# Patient Record
(demographics unavailable — no encounter records)

---

## 2025-03-06 NOTE — DISCUSSION/SUMMARY
[FreeTextEntry1] : Echo Ex stress test 5-day monitor Check labs Same meds Discussed health risks of alcohol in particular cardiac.  Referred to [facility] outpatient. follow up PCP.  [EKG obtained to assist in diagnosis and management of assessed problem(s)] : EKG obtained to assist in diagnosis and management of assessed problem(s)

## 2025-03-06 NOTE — DISCUSSION/SUMMARY
[FreeTextEntry1] : Echo Ex stress test 5-day monitor Check labs Same meds Discussed health risks of alcohol in particular cardiac.  Referred to [facility] outpatient. follow up PCP.  [EKG obtained to assist in diagnosis and management of assessed problem(s)] : EKG obtained to assist in diagnosis and management of assessed problem(s)

## 2025-03-06 NOTE — HISTORY OF PRESENT ILLNESS
[FreeTextEntry1] : Here for follow up at rec of his PCP. Compl of palps in bed every night for past week. Palps are at rest, not exertional. He does report intermittent chest tightness. No presyncope. Still drinking alcohol daily. Wants help quitting, does not think AA will work.

## 2025-05-08 NOTE — HISTORY OF PRESENT ILLNESS
[FreeTextEntry1] : follow up. BP at home 120-150/70s-80s. Palps are not worse. No new complaints. Test results reviewed with patient.